# Patient Record
(demographics unavailable — no encounter records)

---

## 2024-12-16 NOTE — HISTORY OF PRESENT ILLNESS
[de-identified] : 12/16/24 pt is here for Rt hip, states pain started in 4/2024 without injury.  says she thinks she pulled a groin muscle, had xrays and MRI at [facility] was told she has mild osteo arthritis and a complex labral tear, states it started as a throbbing pain and now has on and off sharp pain with walking, pain radiates down to upper thigh area, no numbness or tingling. Did PT  for 4 months without relief. She saw an outside MD (Dr Morocho) where Meloxicam was given with some relief and MRI was ordered.

## 2024-12-16 NOTE — DISCUSSION/SUMMARY
[de-identified] : Moderate R hip OA  - We discussed their diagnosis and treatment options at length including the risks and benefits of both surgical treatment with a total hip replacement and non-surgical options. - We will continue conservative treatment with activity modification, PT, icing, weight loss, and anti-inflammatory medications. - The patient was provided with a PT prescription to work on ROM, hip ER/abductors strengthening, quad/hamstring stretches and strengthening, and other exercises - The patient was advised to let pain guide the gradual advancement of activities. - We also discussed the possible of a corticosteroid injection in order to help decrease inflammation and pain so that they can perform better therapy. - We discussed that a total joint replacement cannot be done within 3 months of a steroid injection to minimize the risk of infection - F/u in 6 weeks for recheck     Prior to appointment and during encounter with patient extensive medical records were reviewed including but not limited to, hospital records, out patient records, imaging results, and lab data. During this appointment the patient was examined, diagnoses were discussed and explained in a face to face manner. In addition extensive time was spent reviewing aforementioned diagnostic studies. Counseling including abnormal image results, differential diagnoses, treatment options, risk and benefits, lifestyle changes, current condition, and current medications was performed. Patient's comments, questions, and concerns were address and patient verbalized understanding.

## 2024-12-16 NOTE — IMAGING
[de-identified] : General: NAD, A&Ox3 Gait: Non-antalgic, no Trendelenburg Foot Progression: neutral Knee Progression: neutral  R Hip Exam: Pain with Log Roll - negative Flexion: 110 Pain with Hyperflexion - yes FADIR - pos TALYA - neg Extension: 20 Flexion Contracture - none Prone Apprehension Test - neg Prone Rotation Test - symmetric Ischial tenderness - none Trochanteric tenderness - none  Abduction - 4 /5, pain - yes Adduction - 5 /5 Supine resisted SLR - 5 /5, pain - no Seated resisted hip flexion - 5 /5, pain - no Dorsiflexion 5/5 Plantarflexion 5/5 EHL 5/5 DP/PT 2+, foot is warm and well perfused     Leg Lengths: symmetric

## 2024-12-16 NOTE — DATA REVIEWED
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 Diffuse chondral surface irregularity with full thickness chondral fissuring and cystic changes to the femoral head. Small hip joint effusion.

## 2024-12-16 NOTE — IMAGING
[de-identified] : General: NAD, A&Ox3 Gait: Non-antalgic, no Trendelenburg Foot Progression: neutral Knee Progression: neutral  R Hip Exam: Pain with Log Roll - negative Flexion: 110 Pain with Hyperflexion - yes FADIR - pos TALYA - neg Extension: 20 Flexion Contracture - none Prone Apprehension Test - neg Prone Rotation Test - symmetric Ischial tenderness - none Trochanteric tenderness - none  Abduction - 4 /5, pain - yes Adduction - 5 /5 Supine resisted SLR - 5 /5, pain - no Seated resisted hip flexion - 5 /5, pain - no Dorsiflexion 5/5 Plantarflexion 5/5 EHL 5/5 DP/PT 2+, foot is warm and well perfused     Leg Lengths: symmetric

## 2024-12-16 NOTE — DISCUSSION/SUMMARY
[de-identified] : Moderate R hip OA  - We discussed their diagnosis and treatment options at length including the risks and benefits of both surgical treatment with a total hip replacement and non-surgical options. - We will continue conservative treatment with activity modification, PT, icing, weight loss, and anti-inflammatory medications. - The patient was provided with a PT prescription to work on ROM, hip ER/abductors strengthening, quad/hamstring stretches and strengthening, and other exercises - The patient was advised to let pain guide the gradual advancement of activities. - We also discussed the possible of a corticosteroid injection in order to help decrease inflammation and pain so that they can perform better therapy. - We discussed that a total joint replacement cannot be done within 3 months of a steroid injection to minimize the risk of infection - F/u in 6 weeks for recheck     Prior to appointment and during encounter with patient extensive medical records were reviewed including but not limited to, hospital records, out patient records, imaging results, and lab data. During this appointment the patient was examined, diagnoses were discussed and explained in a face to face manner. In addition extensive time was spent reviewing aforementioned diagnostic studies. Counseling including abnormal image results, differential diagnoses, treatment options, risk and benefits, lifestyle changes, current condition, and current medications was performed. Patient's comments, questions, and concerns were address and patient verbalized understanding.

## 2024-12-16 NOTE — HISTORY OF PRESENT ILLNESS
[de-identified] : 12/16/24 pt is here for Rt hip, states pain started in 4/2024 without injury.  says she thinks she pulled a groin muscle, had xrays and MRI at [facility] was told she has mild osteo arthritis and a complex labral tear, states it started as a throbbing pain and now has on and off sharp pain with walking, pain radiates down to upper thigh area, no numbness or tingling. Did PT  for 4 months without relief. She saw an outside MD (Dr Morocho) where Meloxicam was given with some relief and MRI was ordered.